# Patient Record
Sex: MALE | Race: WHITE | HISPANIC OR LATINO
[De-identification: names, ages, dates, MRNs, and addresses within clinical notes are randomized per-mention and may not be internally consistent; named-entity substitution may affect disease eponyms.]

---

## 2021-07-27 PROBLEM — Z00.00 ENCOUNTER FOR PREVENTIVE HEALTH EXAMINATION: Status: ACTIVE | Noted: 2021-07-27

## 2021-07-28 ENCOUNTER — APPOINTMENT (OUTPATIENT)
Dept: UROLOGY | Facility: CLINIC | Age: 21
End: 2021-07-28

## 2021-07-28 ENCOUNTER — APPOINTMENT (OUTPATIENT)
Dept: UROLOGY | Facility: CLINIC | Age: 21
End: 2021-07-28
Payer: COMMERCIAL

## 2021-07-28 ENCOUNTER — NON-APPOINTMENT (OUTPATIENT)
Age: 21
End: 2021-07-28

## 2021-07-28 VITALS
TEMPERATURE: 98 F | SYSTOLIC BLOOD PRESSURE: 112 MMHG | BODY MASS INDEX: 19.64 KG/M2 | HEART RATE: 55 BPM | RESPIRATION RATE: 18 BRPM | HEIGHT: 72 IN | WEIGHT: 145 LBS | DIASTOLIC BLOOD PRESSURE: 71 MMHG

## 2021-07-28 DIAGNOSIS — Z80.3 FAMILY HISTORY OF MALIGNANT NEOPLASM OF BREAST: ICD-10-CM

## 2021-07-28 DIAGNOSIS — N45.1 EPIDIDYMITIS: ICD-10-CM

## 2021-07-28 DIAGNOSIS — Z78.9 OTHER SPECIFIED HEALTH STATUS: ICD-10-CM

## 2021-07-28 PROCEDURE — 99204 OFFICE O/P NEW MOD 45 MIN: CPT

## 2021-07-28 PROCEDURE — 99072 ADDL SUPL MATRL&STAF TM PHE: CPT

## 2021-07-28 NOTE — ASSESSMENT
[FreeTextEntry1] : 19 yo male with right epididymitis.  He is currently on a 10 day course of doxycycline 100 mg BID.  He is feeling decreased pain in the right hemiscrotum.  His exam today is relatively unremarkable except for minimal right epididymal tenderness.  He will complete his course of abx.  I advised him to use NSAIDs prn, scrotal support, and ice for discomfort.  He will let me know if the pain does not resolve or if pain worsens.

## 2021-07-28 NOTE — PHYSICAL EXAM
[General Appearance - Well Developed] : well developed [Normal Appearance] : normal appearance [Heart Rate And Rhythm] : Heart rate and rhythm were normal [Abdomen Soft] : soft [] : no respiratory distress [Abdomen Tenderness] : non-tender [Abdomen Hernia] : no hernia was discovered [Costovertebral Angle Tenderness] : no ~M costovertebral angle tenderness [Urethral Meatus] : meatus normal [Penis Abnormality] : normal circumcised penis [Testes Tenderness] : no tenderness of the testes [Testes Mass (___cm)] : there were no testicular masses [Normal Station and Gait] : the gait and station were normal for the patient's age [No Focal Deficits] : no focal deficits [Skin Color & Pigmentation] : normal skin color and pigmentation [Oriented To Time, Place, And Person] : oriented to person, place, and time [FreeTextEntry1] : Normal left epididymis, minimal right epididymal head tenderness

## 2021-07-29 LAB
APPEARANCE: CLEAR
BACTERIA: NEGATIVE
BILIRUBIN URINE: NEGATIVE
BLOOD URINE: NEGATIVE
COLOR: NORMAL
GLUCOSE QUALITATIVE U: NEGATIVE
HYALINE CASTS: 0 /LPF
KETONES URINE: NEGATIVE
LEUKOCYTE ESTERASE URINE: NEGATIVE
MICROSCOPIC-UA: NORMAL
NITRITE URINE: NEGATIVE
PH URINE: 7
PROTEIN URINE: NEGATIVE
RED BLOOD CELLS URINE: 1 /HPF
SPECIFIC GRAVITY URINE: 1.01
SQUAMOUS EPITHELIAL CELLS: 0 /HPF
UROBILINOGEN URINE: NORMAL
WHITE BLOOD CELLS URINE: 0 /HPF

## 2021-07-30 LAB — BACTERIA UR CULT: NORMAL

## 2021-08-05 ENCOUNTER — NON-APPOINTMENT (OUTPATIENT)
Age: 21
End: 2021-08-05

## 2022-02-24 ENCOUNTER — NON-APPOINTMENT (OUTPATIENT)
Age: 22
End: 2022-02-24

## 2022-02-25 ENCOUNTER — APPOINTMENT (OUTPATIENT)
Dept: UROLOGY | Facility: CLINIC | Age: 22
End: 2022-02-25
Payer: COMMERCIAL

## 2022-02-25 DIAGNOSIS — N50.82 SCROTAL PAIN: ICD-10-CM

## 2022-02-25 PROCEDURE — 99213 OFFICE O/P EST LOW 20 MIN: CPT

## 2022-02-25 NOTE — HISTORY OF PRESENT ILLNESS
[FreeTextEntry1] : 7/28/21:\par 21 yo male with acute onset right scrotal pain that started Monday night.  He was seen at [facility] that evening and sent to the ED for evaluation of possible torsion.  US i the ED showed normal blood flow with no evidence of testicular mass.  It did show an enlarged right epididymis with hypervascularity consistent with epididymitis.  A CT scan was also performed to r/o appendicitis.  He as admitted overnight for observation and evaluation by surgery.  Surgery ultimately felt that he did not have appendicitis.  His was discharge don Tuesday with a 10 day course of doxycycline to treat his epididymitis.  He is currently feeling better with decreased scrotal pain.  He denies any dysuria, urgency, hematuria, frequency, or penile discharge,  He is not sexually active.  He denies any scrotal trauma.  \par \par ************\par 2/25/22:\par 22 yo male returns for follow up with similar complaints to what he in July.  He developed progressively increasing discomfort in the right scrotum yesterday morning.  he noted some swelling in the right hemiscrotum.  He denies any voiding complaints.  he denies any fevers, chills, nausea, or vomiting.  He denies any flank pain.  He was advised to go to the ED yesterday afternoon for further evaluation, but his pain essentially resolved and so he did not go to the ED.  He presents today for further evaluation.  A CT scan from July did not show any abdominal. renal, or pelvic pathology.

## 2022-02-25 NOTE — PHYSICAL EXAM
[General Appearance - Well Developed] : well developed [Normal Appearance] : normal appearance [Abdomen Soft] : soft [Abdomen Tenderness] : non-tender [Abdomen Hernia] : no hernia was discovered [Costovertebral Angle Tenderness] : no ~M costovertebral angle tenderness [Urethral Meatus] : meatus normal [Penis Abnormality] : normal circumcised penis [Epididymis] : the epididymides were normal [Testes Tenderness] : no tenderness of the testes [Testes Mass (___cm)] : there were no testicular masses [FreeTextEntry1] : No testicular or epididymal tenderness, erythema, or induration on exam [Skin Color & Pigmentation] : normal skin color and pigmentation [Heart Rate And Rhythm] : Heart rate and rhythm were normal [] : no respiratory distress [Oriented To Time, Place, And Person] : oriented to person, place, and time

## 2022-02-25 NOTE — ASSESSMENT
[FreeTextEntry1] : 20 yo male with resolved right scrotal and RLQ discomfort.  It is unclear what the etiology of these symptoms is.  I will get a scrotal US out of abundance of caution, although his exam is normal today.  I will also refer him to an internist/GI for further evaluation.  He notes that he has had intermittent GI issues over the years and has been advised to have a colonoscopy which he has never had.  These symptoms could be GI related.  \par \par Plan:\par 1. Scrotal US\par 2. F/u after # 1

## 2022-02-27 LAB
APPEARANCE: CLEAR
BACTERIA UR CULT: NORMAL
BACTERIA: NEGATIVE
BILIRUBIN URINE: NEGATIVE
BLOOD URINE: NEGATIVE
COLOR: YELLOW
GLUCOSE QUALITATIVE U: NEGATIVE
HYALINE CASTS: 0 /LPF
KETONES URINE: NEGATIVE
LEUKOCYTE ESTERASE URINE: NEGATIVE
MICROSCOPIC-UA: NORMAL
NITRITE URINE: NEGATIVE
PH URINE: 6.5
PROTEIN URINE: NORMAL
RED BLOOD CELLS URINE: 4 /HPF
SPECIFIC GRAVITY URINE: 1.03
SQUAMOUS EPITHELIAL CELLS: 0 /HPF
URINE COMMENTS: NORMAL
UROBILINOGEN URINE: NORMAL
WHITE BLOOD CELLS URINE: 1 /HPF

## 2022-03-03 ENCOUNTER — NON-APPOINTMENT (OUTPATIENT)
Age: 22
End: 2022-03-03